# Patient Record
Sex: FEMALE | Race: WHITE | NOT HISPANIC OR LATINO | Employment: FULL TIME | URBAN - METROPOLITAN AREA
[De-identification: names, ages, dates, MRNs, and addresses within clinical notes are randomized per-mention and may not be internally consistent; named-entity substitution may affect disease eponyms.]

---

## 2017-09-22 ENCOUNTER — TRANSCRIBE ORDERS (OUTPATIENT)
Dept: ADMINISTRATIVE | Facility: HOSPITAL | Age: 47
End: 2017-09-22

## 2017-09-22 ENCOUNTER — GENERIC CONVERSION - ENCOUNTER (OUTPATIENT)
Dept: OTHER | Facility: OTHER | Age: 47
End: 2017-09-22

## 2017-09-22 DIAGNOSIS — M25.562 LEFT KNEE PAIN, UNSPECIFIED CHRONICITY: Primary | ICD-10-CM

## 2017-09-22 DIAGNOSIS — M25.562 PAIN IN LEFT KNEE: ICD-10-CM

## 2017-09-26 ENCOUNTER — HOSPITAL ENCOUNTER (OUTPATIENT)
Dept: RADIOLOGY | Facility: HOSPITAL | Age: 47
Discharge: HOME | End: 2017-09-26
Attending: RADIOLOGY
Payer: COMMERCIAL

## 2017-09-26 DIAGNOSIS — Z76.89 REFERRAL OF PATIENT WITHOUT EXAMINATION OR TREATMENT: ICD-10-CM

## 2017-09-29 ENCOUNTER — HOSPITAL ENCOUNTER (OUTPATIENT)
Dept: MRI IMAGING | Facility: CLINIC | Age: 47
Discharge: HOME | End: 2017-09-29
Payer: COMMERCIAL

## 2017-09-29 DIAGNOSIS — M25.562 PAIN IN LEFT KNEE: ICD-10-CM

## 2017-09-29 PROCEDURE — 73721 MRI JNT OF LWR EXTRE W/O DYE: CPT

## 2017-10-13 ENCOUNTER — GENERIC CONVERSION - ENCOUNTER (OUTPATIENT)
Dept: OTHER | Facility: OTHER | Age: 47
End: 2017-10-13

## 2017-11-24 ENCOUNTER — GENERIC CONVERSION - ENCOUNTER (OUTPATIENT)
Dept: OTHER | Facility: OTHER | Age: 47
End: 2017-11-24

## 2018-01-19 ENCOUNTER — ALLSCRIPTS OFFICE VISIT (OUTPATIENT)
Dept: OTHER | Facility: OTHER | Age: 48
End: 2018-01-19

## 2018-01-20 NOTE — PROGRESS NOTES
Assessment   1  Right knee pain (299 46) (M25 561)   2  Primary osteoarthritis of right knee (715 16) (M17 11)      53-year-old female osteoarthritis of the right knee with pain and dysfunction as the primary symptom  Injection of corticosteroid is indicated  It is advised, except, administers outlined above  Would welcome the opportunity see back in the office with the preserve process been complete for the left knee, for viscosupplementation to the left knee       Plan   Primary osteoarthritis of right knee    · Follow-up PRN Evaluation and Treatment  Follow-up  Status: Complete  Done:    [id number] 09:33AM    Chief Complaint   1  Knee Pain    History of Present Illness   HPI: 53-year-old female with arthritic knees bilaterally, she continues to await insurance preauthorization for Synvisc-One administration to arthritic left knee  In the interval, she describes return of pain in the right knee  She has pain at level of right knee joint, the pain is made worse bearing weight, and the pain worsens with increased activities should  In the past the symptoms have been well managed by a no other orthopedic surgeon with administration of viscosupplementation  Review of Systems        Constitutional: No fever, no chills, feels well, no tiredness, no recent weight gain or loss  Eyes: No complaints of eyesight problems, no red eyes  ENT: no loss of hearing, no nosebleeds, no sore throat  Cardiovascular: No complaints of chest pain, no palpitations, no leg claudication or lower extremity edema  Respiratory: no compliants of shortness of breath, no wheezing, no cough  Gastrointestinal: no complaints of abdominal pain, no constipation, no nausea or diarrhea, no vomiting, no bloody stools  Genitourinary: no complaints of dysuria, no incontinence  Musculoskeletal: as noted in HPI  Integumentary: no complaints of skin rash or lesion, no itching or dry skin, no skin wounds  Neurological: no complaints of headache, no confusion, no numbness or tingling, no dizziness  Endocrine: No complaints of muscle weakness, no feelings of weakness, no frequent urination, no excessive thirst       Psychiatric: No suicidal thoughts, no anxiety, no feelings of depression  Active Problems   1  Acute internal derangement of knee, left (717 9) (M23 92)   2  Acute medial meniscus tear of left knee, initial encounter (836 0) (S83 242A)   3  Acute pain of left knee (719 46) (M25 562)   4  Knee effusion, left (719 06) (M25 462)    Past Medical History    · History of arthritis (V13 4) (Z87 39)     The active problems and past medical history were reviewed and updated today  Surgical History    · History of Knee Surgery   · History of Neuroplasty Median Nerve At Carpal Tunnel   · History of Oral Surgery Tooth Extraction   · History of Umbilical Hernia Repair     The surgical history was reviewed and updated today  Family History   Mother    · Family history of arthritis (V17 7) (Z82 61)  Sister    · Family history of arthritis (V17 7) (Z82 61)  Grandmother    · Family history of arthritis (V17 7) (Z82 61)    Social History    · Drinks coffee   · Never a smoker   · Social alcohol use (Z78 9)    Current Meds    1  Multivitamins TABS; Therapy: (Recorded:[id number]) to Recorded    Allergies   1  No Known Drug Allergies    Vitals   Signs   Heart Rate: 83  Systolic: 328  Diastolic: 86  Height: 5 ft 9 in  Weight: 290 lb   BMI Calculated: 42 83  BSA Calculated: 2 42    Physical Exam   Gait pattern is with very little antalgia  Breathing is not labored  Right knee is a healed arthroscopic portals  There is no effusion  There is bony enlargement tendons medially  There is crepitation flexion extension  There is no palpable warmth of the synovium  Results/Data   I personally reviewed the films/images/results in the office today  My interpretation follows        X-ray Review Review of previous studies of the right knee suggest medial and patellofemoral compartment arthritis  Procedure      Procedure: Injection of the right knee joint  Indication:  Joint pain-- and-- Osteoarthritis  Risk, benefits and alternatives were discussed with the patient  Verbal consent was obtained prior to the procedure  Alcohol was used to prep the area  ethyl chloride spray was used as a topical anesthetic  A 22-gauge was used to inject 2 mL of 1% Lidocaine,-- 2 mL of 0 25% Bupivacaine-- and-- 2 mL of 6mg/mL betamethasone  A bandage was applied  the patient tolerated the procedure well  Complications: none  Follow-up in the office in When precertification has been arranged month(s)        Signatures    Electronically signed by : ROMARIO Barrera ; Jan 19 2018  9:33AM EST                       (Author)

## 2018-01-22 VITALS
WEIGHT: 290 LBS | HEIGHT: 69 IN | HEART RATE: 83 BPM | BODY MASS INDEX: 42.95 KG/M2 | DIASTOLIC BLOOD PRESSURE: 86 MMHG | SYSTOLIC BLOOD PRESSURE: 150 MMHG

## 2018-01-22 VITALS
DIASTOLIC BLOOD PRESSURE: 107 MMHG | BODY MASS INDEX: 43.01 KG/M2 | WEIGHT: 290.38 LBS | HEART RATE: 73 BPM | SYSTOLIC BLOOD PRESSURE: 161 MMHG | HEIGHT: 69 IN

## 2018-01-22 VITALS — HEIGHT: 69 IN | WEIGHT: 290 LBS | BODY MASS INDEX: 42.95 KG/M2

## 2018-01-22 VITALS — BODY MASS INDEX: 42.95 KG/M2 | WEIGHT: 290 LBS | HEIGHT: 69 IN

## 2018-02-14 ENCOUNTER — TELEPHONE (OUTPATIENT)
Dept: OBGYN CLINIC | Facility: HOSPITAL | Age: 48
End: 2018-02-14

## 2018-02-14 NOTE — TELEPHONE ENCOUNTER
Patient is calling wondering if her injections were ordered  She got a letter from [employer] that her injections were approved  Cindy Crouch

## 2018-02-16 ENCOUNTER — OFFICE VISIT (OUTPATIENT)
Dept: OBGYN CLINIC | Facility: MEDICAL CENTER | Age: 48
End: 2018-02-16
Payer: COMMERCIAL

## 2018-02-16 VITALS
WEIGHT: 270.2 LBS | SYSTOLIC BLOOD PRESSURE: 149 MMHG | HEART RATE: 80 BPM | BODY MASS INDEX: 40.95 KG/M2 | DIASTOLIC BLOOD PRESSURE: 83 MMHG | HEIGHT: 68 IN

## 2018-02-16 DIAGNOSIS — M17.12 PRIMARY OSTEOARTHRITIS OF LEFT KNEE: Primary | ICD-10-CM

## 2018-02-16 PROCEDURE — 20610 DRAIN/INJ JOINT/BURSA W/O US: CPT | Performed by: ORTHOPAEDIC SURGERY

## 2018-02-16 PROCEDURE — 99213 OFFICE O/P EST LOW 20 MIN: CPT | Performed by: ORTHOPAEDIC SURGERY

## 2018-02-16 RX ORDER — FEXOFENADINE HCL 180 MG/1
180 TABLET ORAL DAILY
COMMUNITY

## 2018-02-16 RX ORDER — MENTHOL 5.8 MG/1
LOZENGE ORAL
COMMUNITY

## 2018-02-16 NOTE — PROGRESS NOTES
41 y o female presents for evaluation  She has return of pain in the left knee  She has pain level left knee joint, the pain is made worse bearing weight, the pain increases with increased activities  She desires not aggressive treatments is under alleviate pain as well as promote improved function  Review of Systems  Review of systems negative unless otherwise specified in HPI    Past Medical History  Past Medical History:   Diagnosis Date    Osteoarthritis        Past Surgical History  Past Surgical History:   Procedure Laterality Date    CARPAL TUNNEL RELEASE      HERNIA REPAIR      KNEE SURGERY      WISDOM TOOTH EXTRACTION         Current Medications  No current outpatient prescriptions on file prior to visit  No current facility-administered medications on file prior to visit  Recent Labs [facility])  No results found for: HCT, HGB, WBC, PT, INR, ESR, CRP, GLUCOSE, HGBA1C      Physical exam  Gait pattern has modest antalgia  Breathing is nonlabored  Left thighs devoid of atrophy left knee is in varus  There is no effusion  There is bony enlargement tendons medially  There is crepitation flexion extension  There is no palpable warmth of the synovium  Imaging  MRI scan left knee is again reviewed shows modest medial compartment degeneration  Procedure  An injection of Synvisc-One is offered to the left knee  It is documented below    Large joint arthrocentesis  Date/Time: 2/16/2018 2:42 PM  Consent given by: patient  Supporting Documentation  Indications: pain   Procedure Details  Location: knee - L knee  Preparation: Patient was prepped and draped in the usual sterile fashion  Needle size: 16 G  Approach: lateral  Medications administered: 48 mg hylan 48 MG/6ML    Patient tolerance: patient tolerated the procedure well with no immediate complications  Dressing:  Sterile dressing applied          Assessment/Plan:   41 y  o female with pain and dysfunction left knee  An injection of viscosupplementation is indicated  It is advised, except, administers outlined above    I would welcome the opportunity see back in the office in 3 months time for follow-up

## 2018-02-16 NOTE — PATIENT INSTRUCTIONS
You received an injection of Synvisc-One the left knee today    Should you knee be sore later this evening, liberal use of ice pack would be beneficial

## 2018-05-18 ENCOUNTER — OFFICE VISIT (OUTPATIENT)
Dept: OBGYN CLINIC | Facility: MEDICAL CENTER | Age: 48
End: 2018-05-18
Payer: COMMERCIAL

## 2018-05-18 VITALS
BODY MASS INDEX: 41.1 KG/M2 | SYSTOLIC BLOOD PRESSURE: 127 MMHG | HEART RATE: 75 BPM | HEIGHT: 68 IN | DIASTOLIC BLOOD PRESSURE: 76 MMHG | WEIGHT: 271.17 LBS

## 2018-05-18 DIAGNOSIS — M17.11 PRIMARY OSTEOARTHRITIS OF RIGHT KNEE: Primary | ICD-10-CM

## 2018-05-18 DIAGNOSIS — G89.29 CHRONIC PAIN OF RIGHT KNEE: ICD-10-CM

## 2018-05-18 DIAGNOSIS — M25.561 CHRONIC PAIN OF RIGHT KNEE: ICD-10-CM

## 2018-05-18 PROCEDURE — 20610 DRAIN/INJ JOINT/BURSA W/O US: CPT | Performed by: ORTHOPAEDIC SURGERY

## 2018-05-18 PROCEDURE — 99213 OFFICE O/P EST LOW 20 MIN: CPT | Performed by: ORTHOPAEDIC SURGERY

## 2018-05-18 RX ORDER — BUPIVACAINE HYDROCHLORIDE 2.5 MG/ML
2 INJECTION, SOLUTION INFILTRATION; PERINEURAL
Status: COMPLETED | OUTPATIENT
Start: 2018-05-18 | End: 2018-05-18

## 2018-05-18 RX ORDER — BETAMETHASONE SODIUM PHOSPHATE AND BETAMETHASONE ACETATE 3; 3 MG/ML; MG/ML
12 INJECTION, SUSPENSION INTRA-ARTICULAR; INTRALESIONAL; INTRAMUSCULAR; SOFT TISSUE
Status: COMPLETED | OUTPATIENT
Start: 2018-05-18 | End: 2018-05-18

## 2018-05-18 RX ORDER — LIDOCAINE HYDROCHLORIDE 10 MG/ML
2 INJECTION, SOLUTION INFILTRATION; PERINEURAL
Status: COMPLETED | OUTPATIENT
Start: 2018-05-18 | End: 2018-05-18

## 2018-05-18 RX ADMIN — BUPIVACAINE HYDROCHLORIDE 2 ML: 2.5 INJECTION, SOLUTION INFILTRATION; PERINEURAL at 11:11

## 2018-05-18 RX ADMIN — LIDOCAINE HYDROCHLORIDE 2 ML: 10 INJECTION, SOLUTION INFILTRATION; PERINEURAL at 11:11

## 2018-05-18 RX ADMIN — BETAMETHASONE SODIUM PHOSPHATE AND BETAMETHASONE ACETATE 12 MG: 3; 3 INJECTION, SUSPENSION INTRA-ARTICULAR; INTRALESIONAL; INTRAMUSCULAR; SOFT TISSUE at 11:11

## 2018-05-18 NOTE — PROGRESS NOTES
57 y o female returns today for re-evaluation of her right knee, known OA  She had a cortisone injection in 1/19/18 with relief until recently  On 2/16/18 she had the synviscONE to her LEFT knee with great improvement of her symptoms and overall function  She had visco to her right knee previously with relief  Review of Systems  Review of systems negative unless otherwise specified in HPI    Past Medical History  Past Medical History:   Diagnosis Date    Osteoarthritis        Past Surgical History  Past Surgical History:   Procedure Laterality Date    CARPAL TUNNEL RELEASE      HERNIA REPAIR      KNEE SURGERY      WISDOM TOOTH EXTRACTION         Current Medications  Current Outpatient Prescriptions on File Prior to Visit   Medication Sig Dispense Refill    fexofenadine (ALLEGRA) 180 MG tablet Take 180 mg by mouth daily      Multiple Vitamins-Iron (QC DAILY MULTIVITAMINS/IRON) TABS Take by mouth       No current facility-administered medications on file prior to visit  Recent Labs (HCT,HGB,PT,INR,ESR,CRP,GLU,HgA1C)  No results found for: HCT, HGB, WBC, PT, INR, ESR, CRP, GLUCOSE, HGBA1C      Physical exam  · General: Awake, Alert, Oriented  · Eyes: Pupils equal, round and reactive to light  · Heart: regular rate and rhythm  · Lungs: No audible wheezing  · Abdomen: soft    Right Knee:  Skin intact, good STLT  Mild swelling but no appreciable effusion     TTP medial joint line and patellar facets   Good ROM with patellar crepitus  Stable to varus/valgus stress  Negative mcmurrays  NVID       Imaging      Procedure  Large joint arthrocentesis  Date/Time: 5/18/2018 11:11 AM  Consent given by: patient  Site marked: site marked  Supporting Documentation  Indications: pain   Procedure Details  Location: knee - R knee  Preparation: Patient was prepped and draped in the usual sterile fashion  Needle size: 22 G  Ultrasound guidance: no  Medications administered: 2 mL bupivacaine 0 25 %; 2 mL lidocaine 1 %; 12 mg betamethasone acetate-betamethasone sodium phosphate 6 (3-3) mg/mL    Patient tolerance: patient tolerated the procedure well with no immediate complications  Dressing:  Sterile dressing applied              Assessment/Plan:   57 y  o female with right knee OA, repeat cortisone injection today    ICE and post-injection protocol advised  WBAT  Activities as tolerated  Will see her back in a few weeks for the synviscONE injection to her RIGHT knee

## 2018-06-29 ENCOUNTER — OFFICE VISIT (OUTPATIENT)
Dept: OBGYN CLINIC | Facility: MEDICAL CENTER | Age: 48
End: 2018-06-29
Payer: COMMERCIAL

## 2018-06-29 VITALS
HEART RATE: 84 BPM | HEIGHT: 68 IN | BODY MASS INDEX: 41.07 KG/M2 | DIASTOLIC BLOOD PRESSURE: 86 MMHG | WEIGHT: 271 LBS | SYSTOLIC BLOOD PRESSURE: 123 MMHG

## 2018-06-29 DIAGNOSIS — M17.11 PRIMARY OSTEOARTHRITIS OF RIGHT KNEE: Primary | ICD-10-CM

## 2018-06-29 PROCEDURE — 20610 DRAIN/INJ JOINT/BURSA W/O US: CPT | Performed by: ORTHOPAEDIC SURGERY

## 2018-06-29 NOTE — PROGRESS NOTES
57 y o female presents to the office for follow up of right knee pain  She is here today for her Synvisc-one injection  She is experiencing increased pain with activities  She notes that her pain is in her medial knee  She has gotten good relief from injections in the past  She has no new complaints or concerns today in the office  Review of Systems  Review of systems negative unless otherwise specified in HPI    Past Medical History  Past Medical History:   Diagnosis Date    Osteoarthritis        Past Surgical History  Past Surgical History:   Procedure Laterality Date    CARPAL TUNNEL RELEASE      HERNIA REPAIR      KNEE SURGERY      WISDOM TOOTH EXTRACTION         Current Medications  Current Outpatient Prescriptions on File Prior to Visit   Medication Sig Dispense Refill    fexofenadine (ALLEGRA) 180 MG tablet Take 180 mg by mouth daily      Multiple Vitamins-Iron (QC DAILY MULTIVITAMINS/IRON) TABS Take by mouth       No current facility-administered medications on file prior to visit          Recent Labs (HCT,HGB,PT,INR,ESR,CRP,GLU,HgA1C)  No results found for: HCT, HGB, WBC, PT, INR, ESR, CRP, GLUCOSE, HGBA1C      Physical exam  · General: Awake, Alert, Oriented  · Eyes: Pupils equal, round and reactive to light  · Heart: regular rate and rhythm  · Lungs: No audible wheezing  · Abdomen: soft  Right knee  · Patient ambulates without assistance  · Moderate effusion present  · Crepitation present  · Bony enlargement present  · Tender to palpation medial joint line      Imaging  None    Procedure  Large joint arthrocentesis  Date/Time: 6/29/2018 11:07 AM  Consent given by: patient  Site marked: site marked  Timeout: Immediately prior to procedure a time out was called to verify the correct patient, procedure, equipment, support staff and site/side marked as required   Supporting Documentation  Indications: pain   Procedure Details  Location: knee - R knee  Preparation: Patient was prepped and draped in the usual sterile fashion  Needle size: 22 G  Medications administered: 48 mg hylan 48 MG/6ML    Aspirate amount: 5 (cc) mL  Aspirate: yellow  Patient tolerance: patient tolerated the procedure well with no immediate complications  Dressing:  Sterile dressing applied          Assessment/Plan:   57 y  o female with right knee osteoarthritis     · Synvisc-One for right knee was injected  · Right knee was aspirated  · Continue activities as tolerated  · Follow up in 3 months

## 2018-09-28 ENCOUNTER — OFFICE VISIT (OUTPATIENT)
Dept: OBGYN CLINIC | Facility: MEDICAL CENTER | Age: 48
End: 2018-09-28
Payer: COMMERCIAL

## 2018-09-28 VITALS
HEART RATE: 76 BPM | HEIGHT: 69 IN | SYSTOLIC BLOOD PRESSURE: 121 MMHG | BODY MASS INDEX: 40.58 KG/M2 | DIASTOLIC BLOOD PRESSURE: 91 MMHG | RESPIRATION RATE: 22 BRPM | WEIGHT: 274 LBS

## 2018-09-28 DIAGNOSIS — G89.29 CHRONIC PAIN OF RIGHT KNEE: ICD-10-CM

## 2018-09-28 DIAGNOSIS — M25.561 CHRONIC PAIN OF RIGHT KNEE: ICD-10-CM

## 2018-09-28 DIAGNOSIS — M17.12 PRIMARY OSTEOARTHRITIS OF LEFT KNEE: ICD-10-CM

## 2018-09-28 DIAGNOSIS — G89.29 CHRONIC PAIN OF LEFT KNEE: ICD-10-CM

## 2018-09-28 DIAGNOSIS — M25.562 CHRONIC PAIN OF LEFT KNEE: ICD-10-CM

## 2018-09-28 DIAGNOSIS — M17.11 PRIMARY OSTEOARTHRITIS OF RIGHT KNEE: Primary | ICD-10-CM

## 2018-09-28 PROCEDURE — 20610 DRAIN/INJ JOINT/BURSA W/O US: CPT | Performed by: ORTHOPAEDIC SURGERY

## 2018-09-28 PROCEDURE — 99213 OFFICE O/P EST LOW 20 MIN: CPT | Performed by: ORTHOPAEDIC SURGERY

## 2018-09-28 RX ORDER — LIDOCAINE HYDROCHLORIDE 10 MG/ML
2 INJECTION, SOLUTION INFILTRATION; PERINEURAL
Status: COMPLETED | OUTPATIENT
Start: 2018-09-28 | End: 2018-09-28

## 2018-09-28 RX ORDER — BETAMETHASONE SODIUM PHOSPHATE AND BETAMETHASONE ACETATE 3; 3 MG/ML; MG/ML
12 INJECTION, SUSPENSION INTRA-ARTICULAR; INTRALESIONAL; INTRAMUSCULAR; SOFT TISSUE
Status: COMPLETED | OUTPATIENT
Start: 2018-09-28 | End: 2018-09-28

## 2018-09-28 RX ORDER — BUPIVACAINE HYDROCHLORIDE 2.5 MG/ML
2 INJECTION, SOLUTION INFILTRATION; PERINEURAL
Status: COMPLETED | OUTPATIENT
Start: 2018-09-28 | End: 2018-09-28

## 2018-09-28 RX ADMIN — BUPIVACAINE HYDROCHLORIDE 2 ML: 2.5 INJECTION, SOLUTION INFILTRATION; PERINEURAL at 10:36

## 2018-09-28 RX ADMIN — LIDOCAINE HYDROCHLORIDE 2 ML: 10 INJECTION, SOLUTION INFILTRATION; PERINEURAL at 10:36

## 2018-09-28 RX ADMIN — LIDOCAINE HYDROCHLORIDE 2 ML: 10 INJECTION, SOLUTION INFILTRATION; PERINEURAL at 10:35

## 2018-09-28 RX ADMIN — BETAMETHASONE SODIUM PHOSPHATE AND BETAMETHASONE ACETATE 12 MG: 3; 3 INJECTION, SUSPENSION INTRA-ARTICULAR; INTRALESIONAL; INTRAMUSCULAR; SOFT TISSUE at 10:36

## 2018-09-28 RX ADMIN — BUPIVACAINE HYDROCHLORIDE 2 ML: 2.5 INJECTION, SOLUTION INFILTRATION; PERINEURAL at 10:35

## 2018-09-28 RX ADMIN — BETAMETHASONE SODIUM PHOSPHATE AND BETAMETHASONE ACETATE 12 MG: 3; 3 INJECTION, SUSPENSION INTRA-ARTICULAR; INTRALESIONAL; INTRAMUSCULAR; SOFT TISSUE at 10:35

## 2018-09-28 NOTE — PROGRESS NOTES
57 y o female reports return to weight-bearing pain in both knees following recent trip to [place]  She has pain level both knee joints, the pain is made worse bearing weight, pain increases with increased activities  She desires not aggressive treatments is under alleviate pain and promote long-term increased outcome    Review of Systems  Review of systems negative unless otherwise specified in HPI    Past Medical History  Past Medical History:   Diagnosis Date    Osteoarthritis        Past Surgical History  Past Surgical History:   Procedure Laterality Date    CARPAL TUNNEL RELEASE      HERNIA REPAIR      KNEE SURGERY      WISDOM TOOTH EXTRACTION         Current Medications  Current Outpatient Prescriptions on File Prior to Visit   Medication Sig Dispense Refill    fexofenadine (ALLEGRA) 180 MG tablet Take 180 mg by mouth daily      Multiple Vitamins-Iron (QC DAILY MULTIVITAMINS/IRON) TABS Take by mouth       No current facility-administered medications on file prior to visit  Recent Labs (HCT,HGB,PT,INR,ESR,CRP,GLU,HgA1C)  No results found for: HCT, HGB, WBC, PT, INR, ESR, CRP, GLUCOSE, HGBA1C      Physical exam  · General: Awake, Alert, Oriented  · Eyes: Pupils equal, round and reactive to light  · Heart: regular rate and rhythm  · Lungs: No audible wheezing  · Abdomen: soft  Neither knee has an effusion  There is bony enlargement tendons medially bilaterally  There is crepitation flexion extension  There is no palpable warmth the synovium  Calf compartments bilaterally soft and supple  Toes are warm, sensate, mobile  Imaging  No new images accompany her today    Procedure  Injections of corticosteroid or provided for bilateral knee joints   It is documented below      Large joint arthrocentesis  Date/Time: 9/28/2018 10:35 AM  Consent given by: patient  Supporting Documentation  Indications: pain   Procedure Details  Location: knee - R knee  Needle size: 22 G  Ultrasound guidance: no  Approach: anterolateral  Medications administered: 2 mL bupivacaine 0 25 %; 2 mL lidocaine 1 %; 12 mg betamethasone acetate-betamethasone sodium phosphate 6 (3-3) mg/mL    Patient tolerance: patient tolerated the procedure well with no immediate complications  Dressing:  Sterile dressing applied  Large joint arthrocentesis  Date/Time: 9/28/2018 10:36 AM  Consent given by: patient  Supporting Documentation  Indications: pain   Procedure Details  Location: knee - L knee  Needle size: 22 G  Approach: anterolateral  Medications administered: 2 mL bupivacaine 0 25 %; 2 mL lidocaine 1 %; 12 mg betamethasone acetate-betamethasone sodium phosphate 6 (3-3) mg/mL    Patient tolerance: patient tolerated the procedure well with no immediate complications  Dressing:  Sterile dressing applied          Assessment/Plan:   57 y  o female who has return of symptomatic early arthritis in both knees  Injections corticosteroid indicated for pain relief purposes  They are advised, except, administers outlined above    I would welcome the opportunity see back in 3 months time, I would consider candidate for Synvisc 1 to both knees as the need arises

## 2019-01-11 ENCOUNTER — APPOINTMENT (OUTPATIENT)
Dept: RADIOLOGY | Facility: MEDICAL CENTER | Age: 49
End: 2019-01-11
Payer: COMMERCIAL

## 2019-01-11 ENCOUNTER — OFFICE VISIT (OUTPATIENT)
Dept: OBGYN CLINIC | Facility: MEDICAL CENTER | Age: 49
End: 2019-01-11
Payer: COMMERCIAL

## 2019-01-11 VITALS
BODY MASS INDEX: 40.58 KG/M2 | HEART RATE: 86 BPM | HEIGHT: 69 IN | WEIGHT: 274 LBS | DIASTOLIC BLOOD PRESSURE: 101 MMHG | SYSTOLIC BLOOD PRESSURE: 145 MMHG

## 2019-01-11 DIAGNOSIS — M25.561 ARTHRALGIA OF KNEE, RIGHT: ICD-10-CM

## 2019-01-11 DIAGNOSIS — M17.11 PRIMARY OSTEOARTHRITIS OF RIGHT KNEE: Primary | ICD-10-CM

## 2019-01-11 DIAGNOSIS — M17.11 PRIMARY OSTEOARTHRITIS OF RIGHT KNEE: ICD-10-CM

## 2019-01-11 DIAGNOSIS — E66.01 CLASS 3 SEVERE OBESITY DUE TO EXCESS CALORIES WITH BODY MASS INDEX (BMI) OF 40.0 TO 44.9 IN ADULT, UNSPECIFIED WHETHER SERIOUS COMORBIDITY PRESENT (HCC): ICD-10-CM

## 2019-01-11 DIAGNOSIS — M23.91 INTERNAL DERANGEMENT OF RIGHT KNEE: ICD-10-CM

## 2019-01-11 PROCEDURE — 20610 DRAIN/INJ JOINT/BURSA W/O US: CPT | Performed by: PHYSICIAN ASSISTANT

## 2019-01-11 PROCEDURE — 99213 OFFICE O/P EST LOW 20 MIN: CPT | Performed by: PHYSICIAN ASSISTANT

## 2019-01-11 PROCEDURE — 73562 X-RAY EXAM OF KNEE 3: CPT

## 2019-01-11 RX ORDER — BUPIVACAINE HYDROCHLORIDE 2.5 MG/ML
2 INJECTION, SOLUTION INFILTRATION; PERINEURAL
Status: COMPLETED | OUTPATIENT
Start: 2019-01-11 | End: 2019-01-11

## 2019-01-11 RX ORDER — BETAMETHASONE SODIUM PHOSPHATE AND BETAMETHASONE ACETATE 3; 3 MG/ML; MG/ML
12 INJECTION, SUSPENSION INTRA-ARTICULAR; INTRALESIONAL; INTRAMUSCULAR; SOFT TISSUE
Status: COMPLETED | OUTPATIENT
Start: 2019-01-11 | End: 2019-01-11

## 2019-01-11 RX ORDER — LIDOCAINE HYDROCHLORIDE 10 MG/ML
2 INJECTION, SOLUTION INFILTRATION; PERINEURAL
Status: COMPLETED | OUTPATIENT
Start: 2019-01-11 | End: 2019-01-11

## 2019-01-11 RX ORDER — BUTALBITAL, ASPIRIN, AND CAFFEINE 50; 325; 40 MG/1; MG/1; MG/1
CAPSULE ORAL
COMMUNITY
Start: 2018-12-26 | End: 2021-11-17

## 2019-01-11 RX ADMIN — BETAMETHASONE SODIUM PHOSPHATE AND BETAMETHASONE ACETATE 12 MG: 3; 3 INJECTION, SUSPENSION INTRA-ARTICULAR; INTRALESIONAL; INTRAMUSCULAR; SOFT TISSUE at 09:53

## 2019-01-11 RX ADMIN — LIDOCAINE HYDROCHLORIDE 2 ML: 10 INJECTION, SOLUTION INFILTRATION; PERINEURAL at 09:53

## 2019-01-11 RX ADMIN — BUPIVACAINE HYDROCHLORIDE 2 ML: 2.5 INJECTION, SOLUTION INFILTRATION; PERINEURAL at 09:53

## 2019-01-11 NOTE — PROGRESS NOTES
Subjective;    46-year-old adult female, care of arthralgia both knees  History of right knee arthroscopic procedure years ago for a tear she states, which had a positive impact or improvement short term  Previous need for treatment of the left knee which is currently quiet  She presents the office today because of significant symptoms right poster lateral corner of the knee posterior lateral compartment, and identifies that she is concerned that she may have symptoms akin to a meniscal tear as previously suffered  Though her knees have been x-rayed many times none of them were recent  Though she had an injection of steroids at her last office visit it offered no improvement  She describes no gross effusion or limitation of motion  She does describe pain and difficulty with standing and with knee range of motion, predominantly on incline planes and stairs  Also noted today on arrival to the office, is a decondition patient with BMI of 40 46, and asymptomatic hypertensive blood pressure readings  X-rays of the right knee were taken at this time    Past Medical History:   Diagnosis Date    Osteoarthritis        Past Surgical History:   Procedure Laterality Date    CARPAL TUNNEL RELEASE      HERNIA REPAIR      KNEE SURGERY      WISDOM TOOTH EXTRACTION         Family History   Problem Relation Age of Onset    Osteoporosis Mother        Social History   Substance Use Topics    Smoking status: Never Smoker    Smokeless tobacco: Never Used    Alcohol use Yes     Exam;  Adult female in no acute distress well seated on the exam table  Asymptomatic blood pressure readings that indicate hypertension or increased systolic and diastolic blood pressures  She denies visual disturbances headaches or change in alertness  Her right knee has a very modest effusion and proliferation of the suprapatellar subcutaneous tissue   She is able to extend the knee fully she is able to flex the knee just past 90° palpation yields discomfort in the area occupied by the lateral hamstring posteriorly of the lateral compartment yet a negative Barrington's and negative Apley's grind test   She also has a negative Lachman's and negative drawers test   She has no palpable reproducible medial oriented pain  X-rays; right knee series; three views of the right knee show tricompartmental osteoarthritic changes medial compartment narrowing significant osteophytic formation of both femoral condyles on the Merchant view    Large joint arthrocentesis  Date/Time: 1/11/2019 9:53 AM  Consent given by: patient  Supporting Documentation  Indications: pain   Procedure Details  Location: knee - R knee  Needle size: 22 G  Ultrasound guidance: no  Medications administered: 2 mL bupivacaine 0 25 %; 2 mL lidocaine 1 %; 12 mg betamethasone acetate-betamethasone sodium phosphate 6 (3-3) mg/mL    Aspirate amount: 10 mL  Patient tolerance: patient tolerated the procedure well with no immediate complications  Dressing:  Sterile dressing applied          Impression;    Right knee pain  Right knee osteoarthritis  Internal derangement posterior lateral compartment not explained by her x-rays  Plan; This patient is young far too young to have arthritic surgery on her knee  She is also decondition in regards to weight and her cardiovascular status    It is clear however though she has not responded to tincture of time, as well as a well placed injection to her right knee, and having no affect  Her arthritic changes should have benefit by this well placed injection  Rather I believe she has mechanical symptomatology in the posterior lateral compartment indicative of meniscal injury or tearing, possibly the posterior medial meniscal root      For this the attending surgeon recommends and is requesting an MRI of the right knee    We will see the patient in follow-up after the MRI has been performed and interpreted    Also she should reach out to her medical doctor and begin to embark on a path of wellness, and conditioning      This concluded her experience today her exam was provided by and plan formulated by the attending surgeon it was my privilege to assist him in its delivery

## 2019-01-22 ENCOUNTER — HOSPITAL ENCOUNTER (OUTPATIENT)
Dept: MRI IMAGING | Facility: CLINIC | Age: 49
Discharge: HOME | End: 2019-01-22
Payer: COMMERCIAL

## 2019-01-22 DIAGNOSIS — M23.91 INTERNAL DERANGEMENT OF RIGHT KNEE: ICD-10-CM

## 2019-01-22 DIAGNOSIS — M25.561 ARTHRALGIA OF KNEE, RIGHT: ICD-10-CM

## 2019-01-22 PROCEDURE — 73721 MRI JNT OF LWR EXTRE W/O DYE: CPT

## 2019-02-01 ENCOUNTER — OFFICE VISIT (OUTPATIENT)
Dept: OBGYN CLINIC | Facility: MEDICAL CENTER | Age: 49
End: 2019-02-01
Payer: COMMERCIAL

## 2019-02-01 VITALS
BODY MASS INDEX: 41.92 KG/M2 | HEART RATE: 76 BPM | SYSTOLIC BLOOD PRESSURE: 142 MMHG | RESPIRATION RATE: 18 BRPM | WEIGHT: 283 LBS | DIASTOLIC BLOOD PRESSURE: 80 MMHG | HEIGHT: 69 IN

## 2019-02-01 DIAGNOSIS — M25.561 ARTHRALGIA OF KNEE, RIGHT: Primary | ICD-10-CM

## 2019-02-01 DIAGNOSIS — M17.11 PRIMARY OSTEOARTHRITIS OF RIGHT KNEE: ICD-10-CM

## 2019-02-01 PROCEDURE — 99213 OFFICE O/P EST LOW 20 MIN: CPT | Performed by: ORTHOPAEDIC SURGERY

## 2019-02-01 NOTE — PROGRESS NOTES
Subjective;    56-year-old female patient known to the practice  Patient with history procedures to both knees in her past   Recently she developed anteromedial pain in the knee that was indicative of potential internal derangement  Because of her symptoms and clinical exam an MRI was performed  She returns today to review the MRI findings  She arrives with a small child who is out of school today because of snow and ice  Of note her knee pain has improved since an injection of steroid to the knee and then a twisting move not too long ago after the MRI  Past Medical History:   Diagnosis Date    Osteoarthritis        Past Surgical History:   Procedure Laterality Date    CARPAL TUNNEL RELEASE      HERNIA REPAIR      KNEE SURGERY      WISDOM TOOTH EXTRACTION         Family History   Problem Relation Age of Onset    Osteoporosis Mother        Social History   Substance Use Topics    Smoking status: Never Smoker    Smokeless tobacco: Never Used    Alcohol use Yes      Comment: rarely     Exam;    Patient's right knee has no distension no effusion no overlying bruising  The remainder of her calf has varicosities these are her baseline  The knee is able to extend actively by the patient flexed to greater than 90°  She has very little discomfort of the medial compartment no discomfort of the lateral compartment with range of motion and/or palpation  She has a negative Barrington's test today and a negative Apley's grind test     MRI; MRI shows changes to the medial femoral condyle as well as the posterior patella, it also identifies previous surgical changes to the meniscus  Of note the technical quality of this study is far less than previous MRIs done on the same individual the quality is grainy      Impression;    Right knee arthralgia  Right knee arthritis  Previous history of right knee surgery and partial medial meniscectomy    Plan;    She currently requires no acute event or treatment  She may continue to practice wellness, and weight reduction  She is allowed to have occasional injections to assist her  Given her age clinical exam and radiographic data she is an individual that may benefit by consideration of an osteotomy  The perioperative  Experience was presented by the attending surgeon and then the patient was permitted exit the office    Repeat blood pressure was taken prior to exiting the office and found to be more normotensive,  This accomplished she was permitted exit the office we will see her in the future as her needs require  Her experience was provided by and plan formulated by the attending surgeon was my privilege to assist him in its delivery

## 2020-02-20 ENCOUNTER — OFFICE VISIT (OUTPATIENT)
Dept: OBGYN CLINIC | Facility: CLINIC | Age: 50
End: 2020-02-20
Payer: COMMERCIAL

## 2020-02-20 VITALS
HEART RATE: 76 BPM | WEIGHT: 281.2 LBS | DIASTOLIC BLOOD PRESSURE: 86 MMHG | SYSTOLIC BLOOD PRESSURE: 120 MMHG | BODY MASS INDEX: 41.65 KG/M2 | HEIGHT: 69 IN

## 2020-02-20 DIAGNOSIS — Z12.31 ENCOUNTER FOR SCREENING MAMMOGRAM FOR BREAST CANCER: ICD-10-CM

## 2020-02-20 DIAGNOSIS — N93.0 PCB (POST COITAL BLEEDING): ICD-10-CM

## 2020-02-20 DIAGNOSIS — N93.9 ABNORMAL UTERINE BLEEDING (AUB): ICD-10-CM

## 2020-02-20 DIAGNOSIS — Z01.411 ENCNTR FOR GYN EXAM (GENERAL) (ROUTINE) W ABNORMAL FINDINGS: Primary | ICD-10-CM

## 2020-02-20 PROCEDURE — G0145 SCR C/V CYTO,THINLAYER,RESCR: HCPCS | Performed by: NURSE PRACTITIONER

## 2020-02-20 PROCEDURE — 87624 HPV HI-RISK TYP POOLED RSLT: CPT | Performed by: NURSE PRACTITIONER

## 2020-02-20 PROCEDURE — 99386 PREV VISIT NEW AGE 40-64: CPT | Performed by: NURSE PRACTITIONER

## 2020-02-20 NOTE — PATIENT INSTRUCTIONS
Calcium 1000 mg + 600-1000 IU Vit D daily  Pap with high risk HPV Q 5 years-done, GC/CT declined  Annual mammogram, monthly breast self exam  With continued abnormal bleeding with a normal pelvic ultrasound-will consider birth control options  Req given for pelvic US  Exercise 150 minutes per week minimum  Kegels 20 times twice daily

## 2020-02-20 NOTE — PROGRESS NOTES
Assessment/Plan:     Calcium 1000 mg + 600-1000 IU Vit D daily  Pap with high risk HPV Q 5 years-done, GC/CT declined  Annual mammogram, monthly breast self exam  With continued abnormal bleeding with a normal pelvic ultrasound-will consider birth control options  Req given for pelvic US  Exercise 150 minutes per week minimum  Kegels 20 times twice daily  Negative depression screen  Diagnoses and all orders for this visit:    Encntr for gyn exam (general) (routine) w abnormal findings  -     Liquid-based pap, screening    Encounter for screening mammogram for breast cancer  -     Mammo screening bilateral w 3d & cad; Future    Abnormal uterine bleeding (AUB)  -     US pelvis complete w transvaginal; Future    BMI 40 0-44 9, adult (HCC)    PCB (post coital bleeding)  -     US pelvis complete w transvaginal; Future              Subjective:      Patient ID: Shirlyn Nissen is a 54 y o  female  Shirlyn Nissen is a 54 y o  female who is here today as a new patient for her annual visit  Last gyn visit was 5 years ago but prior to that time she had received regular gynecologic care  Hx of one abnormal pap around age 23 with always normal paps since  Monthly menses x 1-14  days with varied  flow  Menses is acceptable  Shirlyn Nissen is sexually active with male partner/ of 18 years  She has had post coital spotting the last 2 occurrences   had a vasectomy  No abnormal vaginal discharge when not bleeding  Denies pelvic pain, bloating or bowel/bladder changes  Walks on treadmill 2 times per week  The following portions of the patient's history were reviewed and updated as appropriate: allergies, current medications, past family history, past medical history, past social history, past surgical history and problem list     Review of Systems   Constitutional: Negative  Negative for activity change, appetite change, chills, diaphoresis, fatigue, fever and unexpected weight change     HENT: Negative for congestion, dental problem, sneezing, sore throat and trouble swallowing  Eyes: Negative for visual disturbance  Respiratory: Negative for chest tightness and shortness of breath  Cardiovascular: Negative for chest pain and leg swelling  Gastrointestinal: Negative for abdominal pain, constipation, diarrhea, nausea and vomiting  Genitourinary: Positive for menstrual problem  Negative for difficulty urinating, dyspareunia, dysuria, frequency, hematuria, pelvic pain, urgency, vaginal bleeding, vaginal discharge and vaginal pain  Musculoskeletal: Negative for back pain and neck pain  Skin: Negative  Allergic/Immunologic: Negative  Neurological: Negative for weakness and headaches  Hematological: Negative for adenopathy  Psychiatric/Behavioral: Negative  Objective:      /86 (BP Location: Right arm, Patient Position: Sitting, Cuff Size: Large)   Pulse 76   Ht 5' 9" (1 753 m)   Wt 128 kg (281 lb 3 2 oz)   LMP 01/19/2020   BMI 41 53 kg/m²          Physical Exam   Constitutional: She is oriented to person, place, and time  Vital signs are normal  She appears well-developed and well-nourished  Physical exam limited by habitus   HENT:   Head: Normocephalic and atraumatic  Eyes: Right eye exhibits no discharge  Left eye exhibits no discharge  Neck: Trachea normal and normal range of motion  Neck supple  No thyromegaly present  Cardiovascular: Normal rate, regular rhythm, normal heart sounds and intact distal pulses  Pulmonary/Chest: Effort normal and breath sounds normal  Right breast exhibits no inverted nipple, no mass, no nipple discharge, no skin change and no tenderness  Left breast exhibits no inverted nipple, no mass, no nipple discharge, no skin change and no tenderness  No breast tenderness, discharge or bleeding  Breasts are symmetrical    Abdominal: Soft  Normal appearance     Genitourinary: Vagina normal and uterus normal  Rectal exam shows no external hemorrhoid  No breast tenderness, discharge or bleeding  Pelvic exam was performed with patient supine  No labial fusion  There is no rash, tenderness, lesion or injury on the right labia  There is no rash, tenderness, lesion or injury on the left labia  Cervix exhibits no motion tenderness, no discharge and no friability  Right adnexum displays no mass, no tenderness and no fullness  Left adnexum displays no mass, no tenderness and no fullness  No erythema, tenderness or bleeding in the vagina  No foreign body in the vagina  No signs of injury around the vagina  No vaginal discharge found  Genitourinary Comments: Contact bleeding with pap   Musculoskeletal: Normal range of motion  Lymphadenopathy:        Head (right side): No submental, no submandibular and no tonsillar adenopathy present  Head (left side): No submental, no submandibular and no tonsillar adenopathy present  She has no cervical adenopathy  She has no axillary adenopathy  No inguinal adenopathy noted on the right or left side  Right: No inguinal adenopathy present  Left: No inguinal adenopathy present  Neurological: She is alert and oriented to person, place, and time  Skin: Skin is warm and dry  Psychiatric: She has a normal mood and affect  Nursing note and vitals reviewed

## 2020-02-24 LAB
HPV HR 12 DNA CVX QL NAA+PROBE: NEGATIVE
HPV16 DNA CVX QL NAA+PROBE: NEGATIVE
HPV18 DNA CVX QL NAA+PROBE: NEGATIVE

## 2020-02-26 LAB
LAB AP GYN PRIMARY INTERPRETATION: NORMAL
Lab: NORMAL

## 2020-03-14 ENCOUNTER — HOSPITAL ENCOUNTER (OUTPATIENT)
Dept: RADIOLOGY | Facility: MEDICAL CENTER | Age: 50
Discharge: HOME | End: 2020-03-14
Payer: COMMERCIAL

## 2020-03-14 DIAGNOSIS — N93.9 ABNORMAL UTERINE BLEEDING (AUB): ICD-10-CM

## 2020-03-14 DIAGNOSIS — N93.0 PCB (POST COITAL BLEEDING): ICD-10-CM

## 2020-03-14 PROCEDURE — 76830 TRANSVAGINAL US NON-OB: CPT

## 2020-03-14 PROCEDURE — 76856 US EXAM PELVIC COMPLETE: CPT

## 2020-03-17 ENCOUNTER — TELEPHONE (OUTPATIENT)
Dept: OBGYN CLINIC | Facility: CLINIC | Age: 50
End: 2020-03-17

## 2020-03-17 DIAGNOSIS — N83.202 LEFT OVARIAN CYST: Primary | ICD-10-CM

## 2020-03-25 ENCOUNTER — TELEPHONE (OUTPATIENT)
Dept: OBGYN CLINIC | Facility: CLINIC | Age: 50
End: 2020-03-25

## 2020-03-25 NOTE — TELEPHONE ENCOUNTER
Patient called in about us having to reschedule her appointment for today with Dr Isael Garibay, she expressed how she is a bit uncomfortable having to come into a doctors office at this time  She would like to wait some time until this COVID19 calms down a bit

## 2020-03-25 NOTE — TELEPHONE ENCOUNTER
Yes, I have been keeping a list of patients with similar cases  So I can check in with them to reschedule, if they don't

## 2020-03-25 NOTE — TELEPHONE ENCOUNTER
Thank you  Can we set up a flag somehow to touch base with her to make sure she comes in for care in 1-3 months (COVID depending)? She should consider coming in sooner with any abnormal vaginal bleeding

## 2020-04-22 ENCOUNTER — HOSPITAL ENCOUNTER (OUTPATIENT)
Dept: RADIOLOGY | Facility: MEDICAL CENTER | Age: 50
Discharge: HOME | End: 2020-04-22

## 2020-06-11 ENCOUNTER — HOSPITAL ENCOUNTER (OUTPATIENT)
Dept: RADIOLOGY | Facility: MEDICAL CENTER | Age: 50
Discharge: HOME | End: 2020-06-11

## 2021-01-11 ENCOUNTER — HOSPITAL ENCOUNTER (OUTPATIENT)
Dept: RADIOLOGY | Facility: MEDICAL CENTER | Age: 51
Discharge: HOME | End: 2021-01-11
Payer: COMMERCIAL

## 2021-01-11 VITALS — HEIGHT: 69 IN | WEIGHT: 281 LBS | BODY MASS INDEX: 41.62 KG/M2

## 2021-01-11 DIAGNOSIS — Z12.31 ENCOUNTER FOR SCREENING MAMMOGRAM FOR BREAST CANCER: ICD-10-CM

## 2021-01-11 PROCEDURE — 77063 BREAST TOMOSYNTHESIS BI: CPT

## 2021-01-11 PROCEDURE — 77067 SCR MAMMO BI INCL CAD: CPT

## 2021-01-15 ENCOUNTER — HOSPITAL ENCOUNTER (OUTPATIENT)
Dept: ULTRASOUND IMAGING | Facility: CLINIC | Age: 51
Discharge: HOME | End: 2021-01-15
Payer: COMMERCIAL

## 2021-01-15 ENCOUNTER — HOSPITAL ENCOUNTER (OUTPATIENT)
Dept: MAMMOGRAPHY | Facility: CLINIC | Age: 51
Discharge: HOME | End: 2021-01-15
Payer: COMMERCIAL

## 2021-01-15 VITALS — BODY MASS INDEX: 41.62 KG/M2 | WEIGHT: 281 LBS | HEIGHT: 69 IN

## 2021-01-15 DIAGNOSIS — R92.8 ABNORMAL MAMMOGRAM: ICD-10-CM

## 2021-01-15 PROCEDURE — 76642 ULTRASOUND BREAST LIMITED: CPT

## 2021-01-15 PROCEDURE — 77065 DX MAMMO INCL CAD UNI: CPT

## 2021-01-15 PROCEDURE — G0279 TOMOSYNTHESIS, MAMMO: HCPCS

## 2021-01-15 NOTE — RESULT ENCOUNTER NOTE
Ultrasound of the right breast is reassuring  the radiology recommendation is to      "RECOMMENDATION:       - Return to routine screening for both breasts"

## 2021-04-14 NOTE — PROGRESS NOTES
Assessment/Plan:    Calcium 1000 mg + 600-1000 IU Vit D daily  Pap with high risk HPV Q 5 years, if normal  Due 2025  Annual mammogram due 1/22  Monthly breast self exam encouraged  Exercise 150 minutes per week minimum  Weight loss encouraged  Colon cancer screening to begin at age 41 with no other risk factors  Due and referred to Dr John Wiggins 20 times twice daily  Use silicone based lubricant with sex as needed  (Water based only for use with condoms or sexual toys )  Complete pelvic US  Diagnoses and all orders for this visit:    Encntr for gyn exam (general) (routine) w/o abn findings    Cyst of left ovary  -     US pelvis complete w transvaginal; Future    Thickened endometrium  -     US pelvis complete w transvaginal; Future    Encounter for screening mammogram for breast cancer  -     Mammo screening bilateral w 3d & cad; Future    Colon cancer screening  -     Ambulatory referral to Gastroenterology; Future    BMI 40 0-44 9, adult (HCC)    Other orders  -     cetirizine (ZyrTEC) 10 mg tablet; Take 10 mg by mouth daily          Subjective:      Patient ID: Alejandra Frias is a 52 y o  female  Alejandra Frias is a 52 y o  female who is here today for her annual visit  Last seen in office on 2/20/20  She had irregular and post coital bleeding  Pelvic US on 3/14/20 showed: "Mild abnormal appearance of the endometrium  Differential would include endometrial hyperplasia versus endometrial neoplasia  Given the patient's history of irregular, heavy menses, endometrial biopsy recommended  If deferred, follow-up sonography in 6-12 weeks should be obtained  Antoni Kappa Approximately 5 cm simple appearing left ovarian cyst  According to the consensus conference statement from the Society of Radiologists in Ultrasound in this premenopausal woman, this is almost certainly benign, but should receive yearly followup by ultrasound " She declined follow up at that time due to covid        Mostly monthly menses x 5 days with mod  Flow occasionally one day of heavy flow  Menses is acceptable  She no longer experiences PCB  No menstrual migraines and occ cramping that is relieved by ibuprofen  Tony Appiah is sexually active with  of 23 years   had a vasectomy  Normal pap with negative HR HPV 2/20  She plans to start exercising  The following portions of the patient's history were reviewed and updated as appropriate: allergies, current medications, past family history, past medical history, past social history, past surgical history and problem list     Review of Systems   Constitutional: Negative  Negative for activity change, appetite change, chills, diaphoresis, fatigue, fever and unexpected weight change  HENT: Negative for congestion, dental problem, sneezing, sore throat and trouble swallowing  Eyes: Negative for visual disturbance  Respiratory: Negative for chest tightness and shortness of breath  Cardiovascular: Negative for chest pain and leg swelling  Gastrointestinal: Negative for abdominal pain, constipation, diarrhea, nausea and vomiting  Genitourinary: Negative for difficulty urinating, dyspareunia, dysuria, frequency, hematuria, menstrual problem, pelvic pain, urgency, vaginal bleeding, vaginal discharge and vaginal pain  Musculoskeletal: Negative for back pain and neck pain  Skin: Negative  Allergic/Immunologic: Negative  Neurological: Negative for weakness and headaches  Hematological: Negative for adenopathy  Psychiatric/Behavioral: Negative  Objective:      /84 (BP Location: Left arm, Patient Position: Sitting, Cuff Size: Standard) Comment (BP Location): Lower arm  Ht 5' 8" (1 727 m)   Wt 131 kg (289 lb)   LMP 03/23/2021   BMI 43 94 kg/m²          Physical Exam  Vitals signs and nursing note reviewed  Constitutional:       Appearance: Normal appearance  She is well-developed  She is obese     HENT:      Head: Normocephalic and atraumatic  Eyes:      General:         Right eye: No discharge  Left eye: No discharge  Neck:      Musculoskeletal: Normal range of motion and neck supple  Thyroid: No thyromegaly  Trachea: Trachea normal    Cardiovascular:      Rate and Rhythm: Normal rate and regular rhythm  Heart sounds: Normal heart sounds  Pulmonary:      Effort: Pulmonary effort is normal       Breath sounds: Normal breath sounds  Chest:      Breasts: Breasts are symmetrical          Right: Normal  No inverted nipple, mass, nipple discharge, skin change or tenderness  Left: Normal  No inverted nipple, mass, nipple discharge, skin change or tenderness  Abdominal:      Palpations: Abdomen is soft  Genitourinary:     General: Normal vulva  Exam position: Lithotomy position  Labia:         Right: No rash, tenderness, lesion or injury  Left: No rash, tenderness, lesion or injury  Urethra: No prolapse, urethral pain, urethral swelling or urethral lesion  Vagina: Normal  No signs of injury and foreign body  No vaginal discharge, erythema, tenderness or bleeding  Cervix: Normal       Uterus: Normal        Adnexa: Right adnexa normal and left adnexa normal         Right: No mass, tenderness or fullness  Left: No mass, tenderness or fullness  Rectum: No external hemorrhoid  Comments: Physical exam limited by habitus  Musculoskeletal: Normal range of motion  Lymphadenopathy:      Head:      Right side of head: No submental, submandibular or tonsillar adenopathy  Left side of head: No submental, submandibular or tonsillar adenopathy  Cervical: No cervical adenopathy  Upper Body:      Right upper body: No supraclavicular or axillary adenopathy  Left upper body: No supraclavicular or axillary adenopathy  Lower Body: No right inguinal adenopathy  No left inguinal adenopathy  Skin:     General: Skin is warm and dry     Neurological: Mental Status: She is alert and oriented to person, place, and time     Psychiatric:         Mood and Affect: Mood normal

## 2021-04-15 ENCOUNTER — ANNUAL EXAM (OUTPATIENT)
Dept: OBGYN CLINIC | Facility: CLINIC | Age: 51
End: 2021-04-15
Payer: COMMERCIAL

## 2021-04-15 VITALS
SYSTOLIC BLOOD PRESSURE: 128 MMHG | WEIGHT: 289 LBS | DIASTOLIC BLOOD PRESSURE: 84 MMHG | HEIGHT: 68 IN | BODY MASS INDEX: 43.8 KG/M2

## 2021-04-15 DIAGNOSIS — R93.89 THICKENED ENDOMETRIUM: ICD-10-CM

## 2021-04-15 DIAGNOSIS — N83.202 CYST OF LEFT OVARY: ICD-10-CM

## 2021-04-15 DIAGNOSIS — Z01.419 ENCNTR FOR GYN EXAM (GENERAL) (ROUTINE) W/O ABN FINDINGS: Primary | ICD-10-CM

## 2021-04-15 DIAGNOSIS — Z12.31 ENCOUNTER FOR SCREENING MAMMOGRAM FOR BREAST CANCER: ICD-10-CM

## 2021-04-15 DIAGNOSIS — Z12.11 COLON CANCER SCREENING: ICD-10-CM

## 2021-04-15 PROCEDURE — 99396 PREV VISIT EST AGE 40-64: CPT | Performed by: NURSE PRACTITIONER

## 2021-04-15 RX ORDER — CETIRIZINE HYDROCHLORIDE 10 MG/1
10 TABLET ORAL DAILY
COMMUNITY

## 2021-04-15 NOTE — PATIENT INSTRUCTIONS
Calcium 1000 mg + 600-1000 IU Vit D daily  Pap with high risk HPV Q 5 years, if normal  Due 2025  Annual mammogram due 1/22  Monthly breast self exam encouraged  Exercise 150 minutes per week minimum  Colon cancer screening to begin at age 41 with no other risk factors  Due and referred to Dr Ottoniel Wiggins 20 times twice daily  Use silicone based lubricant with sex as needed  (Water based only for use with condoms or sexual toys )  Complete pelvic US

## 2021-04-16 ENCOUNTER — TELEPHONE (OUTPATIENT)
Dept: OBGYN CLINIC | Facility: MEDICAL CENTER | Age: 51
End: 2021-04-16

## 2021-09-14 ENCOUNTER — HOSPITAL ENCOUNTER (OUTPATIENT)
Dept: RADIOLOGY | Facility: MEDICAL CENTER | Age: 51
Discharge: HOME | End: 2021-09-14
Payer: COMMERCIAL

## 2021-09-14 DIAGNOSIS — N83.202 CYST OF LEFT OVARY: ICD-10-CM

## 2021-09-14 DIAGNOSIS — R93.89 THICKENED ENDOMETRIUM: ICD-10-CM

## 2021-09-14 PROCEDURE — 76856 US EXAM PELVIC COMPLETE: CPT

## 2021-09-14 PROCEDURE — 76830 TRANSVAGINAL US NON-OB: CPT

## 2021-09-16 ENCOUNTER — OFFICE VISIT (OUTPATIENT)
Dept: DERMATOLOGY | Facility: CLINIC | Age: 51
End: 2021-09-16
Payer: COMMERCIAL

## 2021-09-16 VITALS — TEMPERATURE: 97.8 F | BODY MASS INDEX: 41.68 KG/M2 | WEIGHT: 275 LBS | HEIGHT: 68 IN

## 2021-09-16 DIAGNOSIS — L30.0 NUMMULAR ECZEMA: ICD-10-CM

## 2021-09-16 DIAGNOSIS — L81.4 LENTIGO: ICD-10-CM

## 2021-09-16 DIAGNOSIS — D18.01 CHERRY ANGIOMA: ICD-10-CM

## 2021-09-16 DIAGNOSIS — D22.60 MULTIPLE BENIGN MELANOCYTIC NEVI OF UPPER AND LOWER EXTREMITIES AND TRUNK: Primary | ICD-10-CM

## 2021-09-16 DIAGNOSIS — L82.1 SEBORRHEIC KERATOSES: ICD-10-CM

## 2021-09-16 DIAGNOSIS — D22.70 MULTIPLE BENIGN MELANOCYTIC NEVI OF UPPER AND LOWER EXTREMITIES AND TRUNK: Primary | ICD-10-CM

## 2021-09-16 DIAGNOSIS — D22.5 MULTIPLE BENIGN MELANOCYTIC NEVI OF UPPER AND LOWER EXTREMITIES AND TRUNK: Primary | ICD-10-CM

## 2021-09-16 PROCEDURE — 99204 OFFICE O/P NEW MOD 45 MIN: CPT | Performed by: DERMATOLOGY

## 2021-09-20 ENCOUNTER — TELEPHONE (OUTPATIENT)
Dept: OBGYN CLINIC | Facility: CLINIC | Age: 51
End: 2021-09-20

## 2021-09-23 ENCOUNTER — TELEPHONE (OUTPATIENT)
Dept: OBGYN CLINIC | Facility: CLINIC | Age: 51
End: 2021-09-23

## 2021-09-23 NOTE — TELEPHONE ENCOUNTER
----- Message from Fanny Garcia sent at 9/23/2021  9:28 AM EDT -----  LM for patient to return my call  Thickened endometrium persists on her pelvic ultrasound  I recommend we now complete the endometrial biopsy that we had originally planned March 2020  Please scheduled  Small fibroid (benign growth) noted in uterus  Small right ovarian cyst with no follow up needed         (Radiology was called for an addendum re: impression of right renal cyst instead of right ovarian cyst )

## 2021-09-23 NOTE — TELEPHONE ENCOUNTER
Per patient's communication consent she does allow us to leave a detailed voicemail  I did let her know there has not been any changes to her pelvic U/S and we would like to schedule her EBX to call the office today at her convenience

## 2021-11-17 ENCOUNTER — OFFICE VISIT (OUTPATIENT)
Dept: OBGYN CLINIC | Facility: CLINIC | Age: 51
End: 2021-11-17
Payer: COMMERCIAL

## 2021-11-17 VITALS — BODY MASS INDEX: 45.31 KG/M2 | DIASTOLIC BLOOD PRESSURE: 84 MMHG | SYSTOLIC BLOOD PRESSURE: 136 MMHG | WEIGHT: 293 LBS

## 2021-11-17 DIAGNOSIS — R93.89 THICKENED ENDOMETRIUM: Primary | ICD-10-CM

## 2021-11-17 DIAGNOSIS — N93.9 ABNORMAL UTERINE BLEEDING (AUB): ICD-10-CM

## 2021-11-17 LAB — SL AMB POCT URINE HCG: NEGATIVE

## 2021-11-17 PROCEDURE — 81025 URINE PREGNANCY TEST: CPT | Performed by: OBSTETRICS & GYNECOLOGY

## 2021-11-17 PROCEDURE — 88305 TISSUE EXAM BY PATHOLOGIST: CPT | Performed by: PATHOLOGY

## 2021-11-17 PROCEDURE — 58100 BIOPSY OF UTERUS LINING: CPT | Performed by: OBSTETRICS & GYNECOLOGY

## 2021-11-17 RX ORDER — IBUPROFEN 600 MG/1
600 TABLET ORAL ONCE
Status: COMPLETED | OUTPATIENT
Start: 2021-11-17 | End: 2021-11-17

## 2021-11-17 RX ADMIN — IBUPROFEN 600 MG: 600 TABLET ORAL at 11:47
